# Patient Record
Sex: FEMALE
[De-identification: names, ages, dates, MRNs, and addresses within clinical notes are randomized per-mention and may not be internally consistent; named-entity substitution may affect disease eponyms.]

---

## 2017-04-05 ENCOUNTER — HOSPITAL ENCOUNTER (OUTPATIENT)
Dept: HOSPITAL 41 - JD.SDS | Age: 52
Discharge: HOME | End: 2017-04-05
Attending: SURGERY
Payer: COMMERCIAL

## 2017-04-05 VITALS — SYSTOLIC BLOOD PRESSURE: 95 MMHG | DIASTOLIC BLOOD PRESSURE: 54 MMHG

## 2017-04-05 DIAGNOSIS — Z12.11: Primary | ICD-10-CM

## 2017-04-05 DIAGNOSIS — Z79.899: ICD-10-CM

## 2017-04-05 DIAGNOSIS — D69.3: ICD-10-CM

## 2017-04-05 DIAGNOSIS — K57.30: ICD-10-CM

## 2017-04-05 DIAGNOSIS — K64.8: ICD-10-CM

## 2017-04-05 DIAGNOSIS — Z90.49: ICD-10-CM

## 2017-04-05 DIAGNOSIS — Z98.51: ICD-10-CM

## 2017-04-05 DIAGNOSIS — Z91.041: ICD-10-CM

## 2017-04-05 DIAGNOSIS — Z79.890: ICD-10-CM

## 2017-04-05 DIAGNOSIS — E03.9: ICD-10-CM

## 2017-04-05 DIAGNOSIS — K64.4: ICD-10-CM

## 2017-04-05 PROCEDURE — 45378 DIAGNOSTIC COLONOSCOPY: CPT

## 2017-04-05 NOTE — PCM.OPNOTE
- General Post-Op/Procedure Note


Date of Surgery/Procedure: 04/05/17


Operative Procedure(s): colonoscopy


Findings: 





anal tag


internal hemorrhoids


two sigmoid diverticulae


Pre Op Diagnosis: screening


Post-Op Diagnosis: 1. anal tag.  2. two small sigmoid diverticula.  3. small 

uncomplicated hemorrhoids


Anesthesia Technique: MAC, Moderate sedation


Primary Surgeon: Vasyl Garza


Pathology: 





none





EBL in mLs: 0


Complications: None


Condition: Good


Free Text/Narrative:: 





After adequate IV sedation and analgesia was obtained the patient was placed on 

her left side. Perianal inspection revealed a small, anal tag, with small 

uncomplicated internal hemorrhoids. Digital rectal examination was normal. A 

lubricated colonoscope was inserted into the rectum then advanced under direct 

vision to the cecum. The bowel preparation was excellent. The cecum, right colon

, transverse, and descending colons were endoscopically normal with no mass, 

lesions or inflammatory changes seen. The sigmoid had 2 very small 

uncomplicated diverticula. The rectum in both views was unremarkable. 

Representative photographs were taken for the patient and for the record. There 

were no procedural complications.

## 2017-04-05 NOTE — PCM.PREANE
Preanesthetic Assessment





- Allergies


Allergies/Adverse Reactions: 


 Allergies











Allergy/AdvReac Type Severity Reaction Status Date / Time


 


iodine Allergy  Hives Verified 04/04/17 16:06














PreAnesthesia Questionnaire





- SUBSTANCE USE


Tobacco Use Within Last Twelve Months: No


Second Hand Smoke Exposure: No


Recreational Drug Use History: No





- HOME MEDS


Home Medications: 


 Home Meds





Escitalopram [Lexapro] 10 mg PO DAILY 04/04/17 [History]


Levothyroxine [Synthroid] 50 mg PO DAILY 04/04/17 [History]


Progesterone,Micronized [Crinone] 1 applic TOP ASDIRECTED 04/04/17 [History]











- CURRENT (IN HOUSE) MEDS


Current Meds: 





 Current Medications





Lactated Ringer's (Ringers, Lactated)  1,000 mls @ 125 mls/hr IV ASDIRECTED SOUTH


   Stop: 04/05/17 23:00


Lidocaine/Sodium Bicarbonate (Buffered Lidocaine 1% In Ns 8.4%)  0.25 ml IV 

ONETIME PRN


   PRN Reason: Prior to IV Start


   Stop: 04/05/17 18:00


Sodium Chloride (Normal Saline)  10 ml FLUSH ONETIME SOUTH


   Stop: 04/05/17 18:00





Discontinued Medications





Fentanyl (Sublimaze) Confirm Administered Dose 100 mcg .ROUTE .STK-MED ONE


   Stop: 04/05/17 07:34


Lidocaine HCl (Xylocaine-Mpf 1%) Confirm Administered Dose 6 mls @ as directed 

.ROUTE .STK-MED ONE


   Stop: 04/05/17 07:34


Midazolam HCl (Versed 1 Mg/Ml) Confirm Administered Dose 2 mg .ROUTE .STK-MED 

ONE


   Stop: 04/05/17 07:35


Propofol (Diprivan  20 Ml) Confirm Administered Dose 200 mg .ROUTE .STK-MED ONE


   Stop: 04/05/17 07:34











Preanesthetic Assessment





- ANESTHESIA/TRANSFUSION/FAMILY HX


Anesthesia/Transfusion History: No Prior Transfusion(s), Prior Anesthesia


Type of Anesthesia Reaction: Reports: Unknown


Family History of Anesthesia Reaction: No


Intubation History: Unknown





- REVIEW OF SYSTEMS


Constitutional: Reports: cold symptoms (resolving )


CNS: Reports: no symptoms


Respiratory: Reports: no symptoms


Cardiovascular: Reports: palpitations (negative stress test monday, echo on 

monday "said I have a slight murmer")


GI: Reports: no symptoms


Other: Reports: None, Thyroid Problems (hypothyroidism- controlled with 

medicartions ), Depression (controlled with medications ), Anxiety





- PHYSICAL ASSESSMENT


HR: 68


O2 Sat by Pulse Oximetry: 96


RR: 16


BP: 116/71


Temp: 37.2 C


Height: 1.68 m


Weight: 79.832 kg


NPO Status Date: 04/04/17


NPO Status Time: 21:00


ASA Class: 2


Mental Status: Alert & Oriented x3


Airway Class: Mallampati = 2


Dentition: Reports: Normal Dentition


Thyro-Mental Finger Breadths: 3


Mouth Opening Finger Breadths: 3


ROM/Head Extension: Full


Respiratory Status: lungs clear to auscultation bilaterally


Cardiovascular Status: regular rate & rhythm, normal S1, S2, no murmur, blood 

pressure WNL





- ALLERGIES


Allergies/Adverse Reactions: 


 Allergies











Allergy/AdvReac Type Severity Reaction Status Date / Time


 


iodine Allergy  Hives Verified 04/04/17 16:06














- BLOOD


Blood Available: No


Product(s) Available: None





- ANESTHESIA PLAN


Preop Beta Blocker: No


Anesthesia Type Planned: MAC





- ACKNOWLEDGEMENTS


Pt an Appropriate Candidate for the Planned Anesthesia: Yes


Alternatives and Risks of Anesthesia Discussed w Pt/Guardian: Yes


Pt/Guardian Understands and Agrees with Anesthesia Plan: Yes

## 2020-09-22 ENCOUNTER — HOSPITAL ENCOUNTER (OUTPATIENT)
Dept: HOSPITAL 41 - JD.SDS | Age: 55
Discharge: HOME | End: 2020-09-22
Attending: OBSTETRICS & GYNECOLOGY
Payer: COMMERCIAL

## 2020-09-22 VITALS — HEART RATE: 68 BPM | DIASTOLIC BLOOD PRESSURE: 74 MMHG | SYSTOLIC BLOOD PRESSURE: 125 MMHG

## 2020-09-22 DIAGNOSIS — N80.0: ICD-10-CM

## 2020-09-22 DIAGNOSIS — N73.6: ICD-10-CM

## 2020-09-22 DIAGNOSIS — N72: ICD-10-CM

## 2020-09-22 DIAGNOSIS — E03.9: ICD-10-CM

## 2020-09-22 DIAGNOSIS — N83.8: ICD-10-CM

## 2020-09-22 DIAGNOSIS — N83.291: ICD-10-CM

## 2020-09-22 DIAGNOSIS — N88.8: ICD-10-CM

## 2020-09-22 DIAGNOSIS — F41.9: ICD-10-CM

## 2020-09-22 DIAGNOSIS — Z79.899: ICD-10-CM

## 2020-09-22 DIAGNOSIS — F32.9: ICD-10-CM

## 2020-09-22 DIAGNOSIS — Z79.890: ICD-10-CM

## 2020-09-22 DIAGNOSIS — N87.9: Primary | ICD-10-CM

## 2020-09-22 DIAGNOSIS — N94.89: ICD-10-CM

## 2020-09-22 DIAGNOSIS — Z01.812: ICD-10-CM

## 2020-09-22 DIAGNOSIS — Z20.828: ICD-10-CM

## 2020-09-22 DIAGNOSIS — E78.00: ICD-10-CM

## 2020-09-22 DIAGNOSIS — N83.292: ICD-10-CM

## 2020-09-22 DIAGNOSIS — Z79.82: ICD-10-CM

## 2020-09-22 PROCEDURE — 80053 COMPREHEN METABOLIC PANEL: CPT

## 2020-09-22 PROCEDURE — 84439 ASSAY OF FREE THYROXINE: CPT

## 2020-09-22 PROCEDURE — 86900 BLOOD TYPING SEROLOGIC ABO: CPT

## 2020-09-22 PROCEDURE — 81001 URINALYSIS AUTO W/SCOPE: CPT

## 2020-09-22 PROCEDURE — 86901 BLOOD TYPING SEROLOGIC RH(D): CPT

## 2020-09-22 PROCEDURE — 84443 ASSAY THYROID STIM HORMONE: CPT

## 2020-09-22 PROCEDURE — 36415 COLL VENOUS BLD VENIPUNCTURE: CPT

## 2020-09-22 PROCEDURE — 87635 SARS-COV-2 COVID-19 AMP PRB: CPT

## 2020-09-22 PROCEDURE — 85025 COMPLETE CBC W/AUTO DIFF WBC: CPT

## 2020-09-22 PROCEDURE — 58552 LAPARO-VAG HYST INCL T/O: CPT

## 2020-09-22 PROCEDURE — 86850 RBC ANTIBODY SCREEN: CPT

## 2020-09-22 PROCEDURE — U0002 COVID-19 LAB TEST NON-CDC: HCPCS

## 2020-09-22 RX ADMIN — SODIUM CHLORIDE ONE ML: 9 INJECTION, SOLUTION INTRAMUSCULAR; INTRAVENOUS; SUBCUTANEOUS at 10:57

## 2020-09-22 RX ADMIN — LIDOCAINE HYDROCHLORIDE AND EPINEPHRINE ONE ML: 10; 10 INJECTION, SOLUTION INFILTRATION; PERINEURAL at 10:57

## 2020-09-22 RX ADMIN — SODIUM CHLORIDE ONE ML: 9 INJECTION, SOLUTION INTRAMUSCULAR; INTRAVENOUS; SUBCUTANEOUS at 11:00

## 2020-09-22 RX ADMIN — Medication PRN ML: at 08:30

## 2020-09-22 RX ADMIN — LIDOCAINE HYDROCHLORIDE AND EPINEPHRINE ONE ML: 10; 10 INJECTION, SOLUTION INFILTRATION; PERINEURAL at 11:00

## 2020-09-22 RX ADMIN — Medication PRN ML: at 08:00

## 2020-09-22 NOTE — [id number]
Post Anesthesia Note





- EVALUATION WITHIN 48HRS OF ANESTHETIC


Vital Signs in Normal Range: Yes


Patient Participated in Evaluation: Yes


Respiratory Function Stable: Yes


Airway Patent: Yes


Cardiovascular Function Stable: Yes


Hydration Status Stable: Yes


Pain Control Satisfactory: Yes


Nausea and Vomiting Control Satisfactory: Yes


Mental Status Recovered: Yes


Vital Signs: 


                                Last Vital Signs











Temp  36.6 C   09/22/20 12:30


 


Pulse  71   09/22/20 08:05


 


Resp  12   09/22/20 12:30


 


BP  124/81   09/22/20 12:30


 


Pulse Ox  100   09/22/20 12:30

## 2020-09-22 NOTE — PCM.PREANE
Preanesthetic Assessment





- Procedure


Proposed Procedure: 





[facility]





- Anesthesia/Transfusion/Family Hx


Anesthesia History: Prior Anesthesia Without Reaction


Family History of Anesthesia Reaction: No


Transfusion History: No Prior Transfusion(s)





- Review of Systems


General: No Symptoms


Pulmonary: No Symptoms


Cardiovascular: No Symptoms


Gastrointestinal: No Symptoms


Neurological: No Symptoms


Other: Reports: Thyroid Problems, Anxiety





- Physical Assessment


NPO Status Date: 09/21/20


NPO Status Time: 22:30


Vital Signs: 





134/88


71


94%


16


96.9


Height: 5 ft 5 in


Weight: 79.7 kg


ASA Class: 2


Mental Status: Alert & Oriented x3


Airway Class: Mallampati = 2


Dentition: Reports: Normal Dentition, Crown(s) (front teeth on top are porcelain

caps)


Thyro-Mental Finger Breadths: 3


Mouth Opening Finger Breadths: 3


ROM/Head Extension: Full


Lungs: Clear to Auscultation, Normal Respiratory Effort


Cardiovascular: Regular Rate, Regular Rhythm, No Murmurs





- Lab


Values: 





                             Laboratory Last Values











WBC  5.87 K/mm3 (3.98-10.04)   09/21/20  09:27    


 


RBC  4.70 M/mm3 (3.98-5.22)   09/21/20  09:27    


 


Hgb  14.0 gm/dl (11.2-15.7)   09/21/20  09:27    


 


Hct  41.5 % (34.1-44.9)   09/21/20  09:27    


 


MCV  88.3 fl (79.4-94.8)   09/21/20  09:27    


 


MCH  29.8 pg (25.6-32.2)   09/21/20  09:27    


 


MCHC  33.7 g/dl (32.2-35.5)   09/21/20  09:27    


 


RDW Std Deviation  42.5 fL (36.4-46.3)   09/21/20  09:27    


 


Plt Count  435 K/mm3 (182-369)  H  09/21/20  09:27    


 


MPV  10.1 fl (9.4-12.3)   09/21/20  09:27    


 


Neut % (Auto)  61.4 % (34.0-71.1)   09/21/20  09:27    


 


Lymph % (Auto)  28.8 % (19.3-51.7)   09/21/20  09:27    


 


Mono % (Auto)  7.7 % (4.7-12.5)   09/21/20  09:27    


 


Eos % (Auto)  1.5  (0.7-5.8)   09/21/20  09:27    


 


Baso % (Auto)  0.3 % (0.1-1.2)   09/21/20  09:27    


 


Neut # (Auto)  3.60 K/mm3 (1.56-6.13)   09/21/20  09:27    


 


Lymph # (Auto)  1.69 K/mm3 (1.18-3.74)   09/21/20  09:27    


 


Mono # (Auto)  0.45 K/mm3 (0.24-0.36)  H  09/21/20  09:27    


 


Eos # (Auto)  0.09 K/mm3 (0.04-0.36)   09/21/20  09:27    


 


Baso # (Auto)  0.02 K/mm3 (0.01-0.08)   09/21/20  09:27    


 


Sodium  142 mEq/L (136-145)   09/21/20  09:27    


 


Potassium  4.0 mEq/L (3.5-5.1)   09/21/20  09:27    


 


Chloride  105 mEq/L ()   09/21/20  09:27    


 


Carbon Dioxide  27 mEq/L (21-32)   09/21/20  09:27    


 


Anion Gap  14.0  (5-15)   09/21/20  09:27    


 


BUN  14 mg/dL (7-18)   09/21/20  09:27    


 


Creatinine  0.9 mg/dL (0.55-1.02)   09/21/20  09:27    


 


Est Cr Clr Drug Dosing  TNP   09/21/20  09:27    


 


Estimated GFR (MDRD)  > 60 mL/min (>60)   09/21/20  09:27    


 


BUN/Creatinine Ratio  15.6  (14-18)   09/21/20  09:27    


 


Glucose  104 mg/dL ()   09/21/20  09:27    


 


Calcium  8.5 mg/dL (8.5-10.1)   09/21/20  09:27    


 


Total Bilirubin  0.3 mg/dL (0.2-1.0)   09/21/20  09:27    


 


AST  50 U/L (15-37)  H  09/21/20  09:27    


 


ALT  122 U/L (14-59)  H  09/21/20  09:27    


 


Alkaline Phosphatase  88 U/L ()   09/21/20  09:27    


 


Total Protein  7.4 g/dl (6.4-8.2)   09/21/20  09:27    


 


Albumin  4.1 g/dl (3.4-5.0)   09/21/20  09:27    


 


Globulin  3.3 gm/dL  09/21/20  09:27    


 


Albumin/Globulin Ratio  1.2  (1-2)   09/21/20  09:27    


 


Free T4  1.07 ng/dL (0.76-1.46)   09/21/20  09:27    


 


TSH 3rd Generation  0.417 uIU/mL (0.358-3.74)   09/21/20  09:27    


 


Urine Color  Yellow  (Yellow)   09/21/20  09:27    


 


Urine Appearance  Clear  (Clear)   09/21/20  09:27    


 


Urine pH  6.0  (5.0-8.0)   09/21/20  09:27    


 


Ur Specific Gravity  1.015  (1.005-1.030)   09/21/20  09:27    


 


Urine Protein  Negative  (Negative)   09/21/20  09:27    


 


Urine Glucose (UA)  Negative  (Negative)   09/21/20  09:27    


 


Urine Ketones  Negative  (Negative)   09/21/20  09:27    


 


Urine Occult Blood  Negative  (Negative)   09/21/20  09:27    


 


Urine Nitrite  Negative  (Negative)   09/21/20  09:27    


 


Urine Bilirubin  Negative  (Negative)   09/21/20  09:27    


 


Urine Urobilinogen  0.2  (0.2-1.0)   09/21/20  09:27    


 


Ur Leukocyte Esterase  Negative  (Negative)   09/21/20  09:27    


 


Urine RBC  0-5 /hpf (0-5)   09/21/20  09:27    


 


Urine WBC  0-5 /hpf (0-5)   09/21/20  09:27    


 


Ur Epithelial Cells  5-10 /hpf (0-5)  H  09/21/20  09:27    


 


Urine Bacteria  Few /hpf (FEW)   09/21/20  09:27    


 


Urine Mucus  Not seen /hpf (FEW)   09/21/20  09:27    


 


SARS-CoV-2 (PCR)  Not detected  (NOT DETECT)   09/18/20  13:00    


 


Blood Type  A POSITIVE   09/21/20  09:27    


 


Gel Antibody Screen  Negative   09/21/20  09:27    














- Allergies


Allergies/Adverse Reactions: 


                                    Allergies











Allergy/AdvReac Type Severity Reaction Status Date / Time


 


Iodinated Contrast Media Allergy  Hives Verified 09/21/20 13:12


 


meperidine [From Demerol] Allergy  Nausea and Verified 09/21/20 13:12





   Vomiting  














- Blood


Blood Available: No





- Acknowledgements


Anesthesia Type Planned: General Anesthesia


Pt an Appropriate Candidate for the Planned Anesthesia: Yes


Alternatives and Risks of Anesthesia Discussed w Pt/Guardian: Yes


Pt/Guardian Understands and Agrees with Anesthesia Plan: Yes





PreAnesthesia Questionnaire


HEENT History: Reports: Cataract, Impaired Vision


Cardiovascular History: Reports: High Cholesterol, Other (See Below)


Other Cardiovascular History: palpitations-panic attack


Respiratory History: Reports: None


Gastrointestinal History: Reports: None


Genitourinary History: Reports: None


OB/GYN History: Reports: Other (See Below)


Other OB/BYN History: thickened endometrium, HRT


Musculoskeletal History: Reports: Other (See Below)


Other Musculoskeletal History: myalgia, foot and leg pain


Neurological History: Reports: Migraines (used to-not now)


Psychiatric History: Reports: Anxiety, Depression


Endocrine/Metabolic History: Reports: Hypothyroidism


Hematologic History: Reports: Other (See Below)


Other Hematologic History: thrombocytopathia


Immunologic History: Reports: None


Oncologic (Cancer) History: Reports: None


Dermatologic History: Reports: None





- Past Surgical History


Head Surgeries/Procedures: Reports: None


HEENT Surgical History: Reports: Cataract Surgery, Oral Surgery, Tonsillectomy


Cardiovascular Surgical History: Reports: None


Respiratory Surgical History: Reports: None


GI Surgical History: Reports: Appendectomy, Colonoscopy


Female  Surgical History: Reports: Breast Reduction, Tubal Ligation


Male  Surgical History: Reports: None


Endocrine Surgical History: Reports: None


Neurological Surgical History: Reports: None


Musculoskeletal Surgical History: Reports: None


Oncologic Surgical History: Reports: None


Dermatological Surgical History: Reports: None





- SUBSTANCE USE


Smoking Status *Q: Never Smoker


Tobacco Use Within Last Twelve Months: No


Second Hand Smoke Exposure: No


Days Per Week of Alcohol Use: 1 (rare)


Recreational Drug Use History: No





- HOME MEDS


Home Medications: 


                                    Home Meds





Aspirin 81 mg PO DAILY 09/21/20 [History]


Docusate Sodium [Stool Softener] 100 mg PO DAILY 09/21/20 [History]


Levothyroxine Sodium 88 mcg PO DAILY 09/21/20 [History]


Vit C/Ascorb Sod/Multivit-Min [Emergen-C 500 mg Chewable Tab] 500 mg PO DAILY 

09/21/20 [History]


busPIRone [Buspar] 7.5 mg PO DAILY 09/21/20 [History]











- CURRENT (IN HOUSE) MEDS


Current Meds: 





                               Current Medications





Lactated Ringer's (Ringers, Lactated)  1,000 mls @ 125 mls/hr IV ASDIRECTED SOUTH


   Stop: 09/22/20 18:00


Lidocaine/Sodium Bicarbonate (Buffered Lidocaine 1% In Ns 8.4%)  0.25 ml IDERM 

ONETIME PRN


   PRN Reason: Prior to IV Start


   Stop: 09/22/20 18:00


Sodium Chloride (Saline Flush)  10 ml FLUSH ASDIRECTED PRN


   PRN Reason: Keep Vein Open


   Stop: 09/22/20 18:00





Discontinued Medications





Cefazolin Sodium (Ancef) Confirm Administered Dose 2 gm .ROUTE .STK-MED ONE


   Stop: 09/22/20 08:33


Dexamethasone (Dexamethasone) Confirm Administered Dose 20 mg .ROUTE .STK-MED 

ONE


   Stop: 09/22/20 08:33


Fentanyl (Sublimaze) Confirm Administered Dose 250 mcg .ROUTE .STK-MED ONE


   Stop: 09/22/20 08:33


Lidocaine HCl (Xylocaine-Mpf 1%) Confirm Administered Dose 4 mls @ as directed 

.ROUTE .STK-MED ONE


   Stop: 09/22/20 08:33


Lactated Ringer's (Ringers, Lactated) Confirm Administered Dose 1,000 mls @ as 

directed .ROUTE .STK-MED ONE


   Stop: 09/22/20 08:33


Midazolam HCl (Versed 1 Mg/Ml) Confirm Administered Dose 2 mg .ROUTE .STK-MED 

ONE


   Stop: 09/22/20 08:33


Ondansetron HCl (Zofran) Confirm Administered Dose 4 mg .ROUTE .STK-MED ONE


   Stop: 09/22/20 08:33


Propofol (Diprivan  20 Ml) Confirm Administered Dose 400 mg .ROUTE .STK-MED ONE


   Stop: 09/22/20 08:33


Rocuronium Bromide (Zemuron) Confirm Administered Dose 50 mg .ROUTE .STK-MED ONE


   Stop: 09/22/20 08:33

## 2020-09-22 NOTE — PCM.POSTAN
Calling regarding: Pt requested a call back re pt stated she was treated for a UTI a couple of weeks ago and the symptoms are returning. Pt stated she has pressure and frequency, but no pain, burning or fever. Please advise.               Caller: Pt    Timeframe for callback: Today      Pharmacy information verified:  Yes     Phone number to be reached at: CELL: [phone]            POST ANESTHESIA ASSESSMENT





- MENTAL STATUS


Mental Status: Alert, Oriented





- VITAL SIGNS


Vital Signs: 


                                Last Vital Signs











Temp  36.4 C   09/22/20 11:46


 


Pulse  95  09/22/20 1146


 


Resp  16   09/22/20 11:46


 


BP  133/83   09/22/20 11:46


 


Pulse Ox  98   09/22/20 11:46














- RESPIRATORY


Respiratory Status: Respiratory Rate WNL, Airway Patent, O2 Saturation Stable, 

Supplemental Oxygen





- CARDIOVASCULAR


CV Status: Pulse Rate WNL, Blood Pressure Stable





- GASTROINTESTINAL


GI Status: No Symptoms





- PAIN


Pain Score: 0





- POST OP HYDRATION


Hydration Status: Adequate & Stable

## 2021-03-30 ENCOUNTER — HOSPITAL ENCOUNTER (OUTPATIENT)
Dept: HOSPITAL 41 - JD.SDS | Age: 56
Setting detail: OBSERVATION
LOS: 1 days | Discharge: HOME | End: 2021-03-31
Attending: OBSTETRICS & GYNECOLOGY | Admitting: OBSTETRICS & GYNECOLOGY
Payer: COMMERCIAL

## 2021-03-30 DIAGNOSIS — Z91.041: ICD-10-CM

## 2021-03-30 DIAGNOSIS — E03.9: ICD-10-CM

## 2021-03-30 DIAGNOSIS — Z79.899: ICD-10-CM

## 2021-03-30 DIAGNOSIS — G47.00: ICD-10-CM

## 2021-03-30 DIAGNOSIS — Z98.890: ICD-10-CM

## 2021-03-30 DIAGNOSIS — E78.00: ICD-10-CM

## 2021-03-30 DIAGNOSIS — N81.3: Primary | ICD-10-CM

## 2021-03-30 DIAGNOSIS — Z79.82: ICD-10-CM

## 2021-03-30 DIAGNOSIS — N99.840: ICD-10-CM

## 2021-03-30 DIAGNOSIS — Z79.890: ICD-10-CM

## 2021-03-30 PROCEDURE — 57260 CMBN ANT PST COLPRHY: CPT

## 2021-03-30 PROCEDURE — 96374 THER/PROPH/DIAG INJ IV PUSH: CPT

## 2021-03-30 PROCEDURE — 85025 COMPLETE CBC W/AUTO DIFF WBC: CPT

## 2021-03-30 PROCEDURE — 81001 URINALYSIS AUTO W/SCOPE: CPT

## 2021-03-30 PROCEDURE — 96376 TX/PRO/DX INJ SAME DRUG ADON: CPT

## 2021-03-30 PROCEDURE — 94760 N-INVAS EAR/PLS OXIMETRY 1: CPT

## 2021-03-30 PROCEDURE — 36415 COLL VENOUS BLD VENIPUNCTURE: CPT

## 2021-03-30 PROCEDURE — 93005 ELECTROCARDIOGRAM TRACING: CPT

## 2021-03-30 PROCEDURE — G0378 HOSPITAL OBSERVATION PER HR: HCPCS

## 2021-03-30 PROCEDURE — 56810 PERINEOPLASTY RPR PER NONOB: CPT

## 2021-03-30 RX ADMIN — KETOROLAC TROMETHAMINE PRN MG: 15 INJECTION, SOLUTION INTRAMUSCULAR; INTRAVENOUS at 23:51

## 2021-03-30 RX ADMIN — LIDOCAINE HYDROCHLORIDE,EPINEPHRINE BITARTRATE ONE ML: 10; .01 INJECTION, SOLUTION INFILTRATION; PERINEURAL at 09:29

## 2021-03-30 RX ADMIN — FENTANYL CITRATE PRN MCG: 50 INJECTION, SOLUTION INTRAMUSCULAR; INTRAVENOUS at 11:06

## 2021-03-30 RX ADMIN — SODIUM CHLORIDE ONE ML: 9 INJECTION, SOLUTION INTRAMUSCULAR; INTRAVENOUS; SUBCUTANEOUS at 09:24

## 2021-03-30 RX ADMIN — LIDOCAINE HYDROCHLORIDE,EPINEPHRINE BITARTRATE ONE ML: 10; .01 INJECTION, SOLUTION INFILTRATION; PERINEURAL at 09:24

## 2021-03-30 RX ADMIN — FENTANYL CITRATE PRN MCG: 50 INJECTION, SOLUTION INTRAMUSCULAR; INTRAVENOUS at 10:38

## 2021-03-30 RX ADMIN — SODIUM CHLORIDE ONE ML: 9 INJECTION, SOLUTION INTRAMUSCULAR; INTRAVENOUS; SUBCUTANEOUS at 09:29

## 2021-03-30 NOTE — PCM.OPNOTE
- General Post-Op/Procedure Note


Date of Surgery/Procedure: 03/30/21


Operative Procedure(s): Exam under anesthesia, evacuation of vaginal hematoma


Findings: 





Under anesthesia patient was noted to have a tense area in the rectovaginal 

septum consistent with a hematoma.  Hematoma itself was approximately 200 to 300

cc.  An active arterial bleeder was noted on the patient's left side.  Rectal 

mucosa remained intact.


Pre Op Diagnosis: Vaginal hematoma


Post-Op Diagnosis: Same


Anesthesia Technique: General ET Tube


Primary Surgeon: Arley Keen


Secondary Surgeon: Darrius Acuna


Anesthesia Provider: Valentina Hearn


Assistant: Fausto Toussaint


Reason Assistant Was Necessary: 





Retraction, assistance, patient safety, quality of care.


Fluid Replacement, Intraop: 1,400


Output, Urine Amount: 300


EBL in mLs: 500 (Routine clot and EBL at time of surgery)


Drain/Tube Comments:: Indwelling bladder catheter placed at the end of the 

procedure with resultant clear urine.


Complications: None


Condition: Good


Free Text/Narrative:: 


                                 Intake & Output











 03/30/21 03/30/21 03/30/21





 06:59 14:59 22:59


 


Intake Total  1900 1700


 


Output Total  600 


 


Balance  1300 1700








Surgery duration: 37 minutes





Procedure: Discussion was held with patient as to the anticipated procedure, 

potential variations of the procedure consent was signed.  Patient was taken to 

the operating room and placed in the supine position on the operating table.  

She received 2 g of Ancef preoperatively for infection prophylaxis and had 

sequential compression stockings in place for DVT prophylaxis.  After adequate 

anesthesia patient was placed in a dorsolithotomy position and prepped and 

draped in usual fashion.  Internal vaginal prep was not performed except for the

instillation of Betadine into the vaginal.  Exam under anesthesia showed a tense

vaginal incision from the posterior vaginal repair done earlier in the day.  

Rectal exam showed a bulge into the rectum consistent with.





The suture line overlying the posterior vaginal repair was then opened and 

hematoma was evacuated.  200 to 300 cc of blood were present.  There was a small

arterial bleeder on the left side of the incision and this was controlled with a

figure-of-eight suture of 0 Monocryl.  Some mucosal venous bleeders were noted. 

These were controlled eventually with interrupted mucosal closure using 

figure-of-eight sutures of 3-0 Monocryl.  Prior to the closure of the vaginal 

mucosa Gelfoam was placed in the area to further establish hemostasis of any 

potential vaginal venous bleeding.





After the vaginal close was reapproximated, the perineoplasty was evaluated and 

a short running suture of 3-0 Monocryl was used to reestablish approximate the 

skin edges overlying perineoplasty repair.  Should be noted perineoplasty was 

not taken down for the evacuation of the hematoma.





Patient tolerated procedure well.  Robison catheter was placed at the end of the 

procedure to drain the bladder.  Patient was returned to the supine position and

awakened from general endotracheal anesthesia.  Condition: Good.

## 2021-03-30 NOTE — PCM.POSTAN
POST ANESTHESIA ASSESSMENT





- MENTAL STATUS


Mental Status: Alert, Oriented





- VITAL SIGNS


Vital Signs: 


                                Last Vital Signs











Temp  36.3 C   03/30/21 07:35


 


Pulse  64   03/30/21 07:35


 


Resp  16   03/30/21 07:35


 


BP  110/74   03/30/21 07:35


 


Pulse Ox  95   03/30/21 07:35














- RESPIRATORY


Respiratory Status: Respiratory Rate WNL, Airway Patent, O2 Saturation Stable, 

Supplemental Oxygen





- CARDIOVASCULAR


CV Status: Pulse Rate WNL, Blood Pressure Stable





- GASTROINTESTINAL


GI Status: No Symptoms





- PAIN


Pain Score: 4





- POST OP HYDRATION


Hydration Status: Adequate & Stable

## 2021-03-30 NOTE — [id number]
Post Anesthesia Note





- EVALUATION WITHIN 48HRS OF ANESTHETIC


Vital Signs in Normal Range: Yes


Patient Participated in Evaluation: Yes


Respiratory Function Stable: Yes


Airway Patent: Yes


Cardiovascular Function Stable: Yes


Hydration Status Stable: Yes


Pain Control Satisfactory: Yes


Nausea and Vomiting Control Satisfactory: Yes


Mental Status Recovered: Yes


Vital Signs: 


                                Last Vital Signs











Temp  36.4 C   03/30/21 12:00


 


Pulse  60   03/30/21 12:00


 


Resp  16   03/30/21 12:00


 


BP  108/70   03/30/21 12:00


 


Pulse Ox  100   03/30/21 12:00

## 2021-03-30 NOTE — PCM.POSTAN
POST ANESTHESIA ASSESSMENT





- MENTAL STATUS


Mental Status: Alert, Oriented





- VITAL SIGNS


Vital Signs: 


                                Last Vital Signs











Temp  97.6  03/30/21 1936


 


Pulse  80  03/30/21 1936


 


Resp  16  03/30/21 1936


 


BP   100/61  03/30/21 1936


 


Pulse Ox  96%  03/30/21 1936














- RESPIRATORY


Respiratory Status: Respiratory Rate WNL, Airway Patent, O2 Saturation Stable, 

Supplemental Oxygen





- CARDIOVASCULAR


CV Status: Pulse Rate WNL, Blood Pressure Stable





- GASTROINTESTINAL


GI Status: No Symptoms





- POST OP HYDRATION


Hydration Status: Adequate & Stable

## 2021-03-30 NOTE — PCM.PREANE
Preanesthetic Assessment





- Procedure


Proposed Procedure: 





Anterior and Posterior Repair, Perineaplasty





- Anesthesia/Transfusion/Family Hx


Anesthesia History: Prior Anesthesia Without Reaction


Family History of Anesthesia Reaction: No


Transfusion History: No Prior Transfusion(s)





- Review of Systems


General: Fatigue, Other (Insomnia, Hot Flashes. )


Pulmonary: No Symptoms


Cardiovascular: No Symptoms


Gastrointestinal: No Symptoms


Neurological: Headache (Migraines, none today. )


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 

ASA daily. Stopped for surgery x 2 weeks.), Thyroid Problems (Hypothyroidism), 

Depression, Anxiety





- Physical Assessment


NPO Status Date: 03/29/21


NPO Status Time: 23:00


Vital Signs: 





                                Last Vital Signs











Temp  36.3 C   03/30/21 07:35


 


Pulse  64   03/30/21 07:35


 


Resp  16   03/30/21 07:35


 


BP  110/74   03/30/21 07:35


 


Pulse Ox  95   03/30/21 07:35











Height: 1.65 m


Weight: 79.832 kg


ASA Class: 2


Mental Status: Alert & Oriented x3


Airway Class: Mallampati = 2


Dentition: Reports: Normal Dentition


Thyro-Mental Finger Breadths: 3


Mouth Opening Finger Breadths: 3


ROM/Head Extension: Full


Lungs: Clear to Auscultation, Normal Respiratory Effort


Cardiovascular: Regular Rate, Regular Rhythm





- Lab


Values: 





                             Laboratory Last Values











WBC  7.11 K/mm3 (3.98-10.04)   03/29/21  11:52    


 


RBC  5.16 M/mm3 (3.98-5.22)   03/29/21  11:52    


 


Hgb  15.0 gm/dl (11.2-15.7)   03/29/21  11:52    


 


Hct  45.6 % (34.1-44.9)  H  03/29/21  11:52    


 


MCV  88.4 fl (79.4-94.8)   03/29/21  11:52    


 


MCH  29.1 pg (25.6-32.2)   03/29/21  11:52    


 


MCHC  32.9 g/dl (32.2-35.5)   03/29/21  11:52    


 


RDW Std Deviation  42.1 fL (36.4-46.3)   03/29/21  11:52    


 


Plt Count  447 K/mm3 (182-369)  H  03/29/21  11:52    


 


MPV  10.6 fl (9.4-12.3)   03/29/21  11:52    


 


Neut % (Auto)  52.6 % (34.0-71.1)   03/29/21  11:52    


 


Lymph % (Auto)  36.8 % (19.3-51.7)   03/29/21  11:52    


 


Mono % (Auto)  8.6 % (4.7-12.5)   03/29/21  11:52    


 


Eos % (Auto)  1.0  (0.7-5.8)   03/29/21  11:52    


 


Baso % (Auto)  0.6 % (0.1-1.2)   03/29/21  11:52    


 


Neut # (Auto)  3.74 K/mm3 (1.56-6.13)   03/29/21  11:52    


 


Lymph # (Auto)  2.62 K/mm3 (1.18-3.74)   03/29/21  11:52    


 


Mono # (Auto)  0.61 K/mm3 (0.24-0.36)  H  03/29/21  11:52    


 


Eos # (Auto)  0.07 K/mm3 (0.04-0.36)   03/29/21  11:52    


 


Baso # (Auto)  0.04 K/mm3 (0.01-0.08)   03/29/21  11:52    


 


Manual Slide Review  Abnormal smear   03/29/21  11:52    


 


Urine Color  Light yellow  (Yellow)   03/29/21  11:54    


 


Urine Appearance  Clear  (Clear)   03/29/21  11:54    


 


Urine pH  7.0  (5.0-8.0)   03/29/21  11:54    


 


Ur Specific Gravity  1.015  (1.005-1.030)   03/29/21  11:54    


 


Urine Protein  Negative  (Negative)   03/29/21  11:54    


 


Urine Glucose (UA)  Negative  (Negative)   03/29/21  11:54    


 


Urine Ketones  Negative  (Negative)   03/29/21  11:54    


 


Urine Occult Blood  Negative  (Negative)   03/29/21  11:54    


 


Urine Nitrite  Negative  (Negative)   03/29/21  11:54    


 


Urine Bilirubin  Negative  (Negative)   03/29/21  11:54    


 


Urine Urobilinogen  0.2  (0.2-1.0)   03/29/21  11:54    


 


Ur Leukocyte Esterase  Negative  (Negative)   03/29/21  11:54    














- Allergies


Allergies/Adverse Reactions: 


                                    Allergies











Allergy/AdvReac Type Severity Reaction Status Date / Time


 


Iodinated Contrast Media Allergy  Hives Verified 03/29/21 15:05


 


meperidine [From Demerol] AdvReac  Nausea and Verified 03/29/21 15:05





   Vomiting  














- Anesthesia Plan


Pre-Op Medication Ordered: Anxiolytic





- Acknowledgements


Anesthesia Type Planned: General Anesthesia


Pt an Appropriate Candidate for the Planned Anesthesia: Yes


Alternatives and Risks of Anesthesia Discussed w Pt/Guardian: Yes


Pt/Guardian Understands and Agrees with Anesthesia Plan: Yes





PreAnesthesia Questionnaire


HEENT History: Reports: Cataract, Impaired Vision


Cardiovascular History: Reports: High Cholesterol, Other (See Below)


Respiratory History: 


Gastrointestinal History: 


Genitourinary History: 


OB/GYN History: Reports: Other (See Below)


Other OB/BYN History: thickened endometrium, hot flashes, right ovarian cyst, 

post menpausal bleeding, breast reduction


Musculoskeletal History: Reports: Other (See Below)


Other Musculoskeletal History: myalgia, foot and leg pain


Neurological History: Reports: Migraines


Psychiatric History: Reports: Anxiety, Depression


Endocrine/Metabolic History: Reports: Hypothyroidism


Hematologic History: Reports: Other (See Below)


Other Hematologic History: thrombocytopathia


Immunologic History: Reports: None


Oncologic (Cancer) History: Reports: None


Dermatologic History: Reports: None





- Past Surgical History


Head Surgeries/Procedures: Reports: None


HEENT Surgical History: Reports: Cataract Surgery, Oral Surgery, Tonsillectomy


Cardiovascular Surgical History: Reports: None


Respiratory Surgical History: 


GI Surgical History: Reports: Appendectomy, Colonoscopy


Female  Surgical History: Reports: Hysterectomy, Tubal Ligation


Male  Surgical History: 


Endocrine Surgical History: Reports: None


Neurological Surgical History: Reports: None


Musculoskeletal Surgical History: Reports: None


Oncologic Surgical History: Reports: None


Dermatological Surgical History: Reports: None





- SUBSTANCE USE


Tobacco Use Status *Q: Never Tobacco User


Recreational Drug Use History: No





- HOME MEDS


Home Medications: 


                                    Home Meds





Aspirin 81 mg PO DAILY 09/21/20 [History]


Levothyroxine Sodium 88 mcg PO DAILY 09/21/20 [History]


Vit C/Ascorb Sod/Multivit-Min [Emergen-C 500 mg Chewable Tab] 500 mg PO DAILY 

09/21/20 [History]


Cholecalciferol (Vitamin D3) [Vitamin D3] 1,000 unit PO DAILY 03/29/21 [History]











- CURRENT (IN HOUSE) MEDS


Current Meds: 





                               Current Medications





Fentanyl (Fentanyl 100 Mcg/2 Ml Sdv)  50 mcg IVPUSH Q5M PRN


   PRN Reason: Pain


   Stop: 03/30/21 12:01


Hydromorphone HCl (Hydromorphone 0.5 Mg/0.5 Ml Syringe)  0.5 mg IVPUSH Q10M PRN


   PRN Reason: Pain (severe 7-10)


   Stop: 03/30/21 12:00


Lactated Ringer's (Ringers, Lactated)  1,000 mls @ 125 mls/hr IV ASDIRECTED SOUTH


   Last Admin: 03/30/21 07:50 Dose:  125 mls/hr


   Documented by: 


Lidocaine/Sodium Bicarbonate (Lidocaine 1%/Sod Bicarbonate In Ns 8.4% 1 Ml 

Syringe)  0.25 ml IDERM ONETIME PRN


   PRN Reason: Prior to IV Start


   Last Admin: 03/30/21 07:49 Dose:  0.25 ml


   Documented by: 


Ondansetron HCl (Ondansetron 4 Mg/2 Ml Sdv)  4 mg IVPUSH ONETIME PRN


   PRN Reason: Nausea/Vomiting


   Stop: 03/30/21 12:00


Sodium Chloride (Sodium Chloride 0.9% 10 Ml Syringe)  10 ml FLUSH ASDIRECTED PRN


   PRN Reason: Keep Vein Open





Discontinued Medications





Cefazolin Sodium (Cefazolin 1 Gm Vial) Confirm Administered Dose 2 gm .ROUTE 

.STK-MED ONE


   Stop: 03/30/21 07:16


Dexamethasone (Dexamethasone 4 Mg/Ml 5 Ml Mdv) Confirm Administered Dose 20 mg 

.ROUTE .STK-MED ONE


   Stop: 03/30/21 07:17


Fentanyl (Fentanyl 250 Mcg/5 Ml Sdv) Confirm Administered Dose 250 mcg .ROUTE 

.STK-MED ONE


   Stop: 03/30/21 07:17


Lidocaine HCl (Xylocaine-Mpf 1%) Confirm Administered Dose 4 mls @ as directed 

.ROUTE .STK-MED ONE


   Stop: 03/30/21 07:16


Lactated Ringer's (Ringers, Lactated) Confirm Administered Dose 1,000 mls @ as 

directed .ROUTE .STK-MED ONE


   Stop: 03/30/21 07:16


Lidocaine HCl (Xylocaine-Mpf 1%) Confirm Administered Dose 4 mls @ as directed 

.ROUTE .STK-MED ONE


   Stop: 03/30/21 07:17


Lidocaine/Epinephrine (Lidocaine 1% With Epinephrine 1:100,000 10 Ml Mdv) 

Confirm Administered Dose 10 ml .ROUTE .STK-MED ONE


   Stop: 03/30/21 07:48


Midazolam HCl (Midazolam 1 Mg/Ml 2 Ml Sdv) Confirm Administered Dose 2 mg .ROUTE

 .STK-MED ONE


   Stop: 03/30/21 07:17


Ondansetron HCl (Ondansetron 4 Mg/2 Ml Sdv) Confirm Administered Dose 4 mg 

.ROUTE .STK-MED ONE


   Stop: 03/30/21 07:16


Propofol (Propofol 200 Mg/20 Ml Sdv) Confirm Administered Dose 400 mg .ROUTE 

.STK-MED ONE


   Stop: 03/30/21 07:17


Rocuronium Bromide (Rocuronium 50 Mg/5 Ml Vial) Confirm Administered Dose 50 mg 

.ROUTE .STK-MED ONE


   Stop: 03/30/21 07:16


Sodium Chloride (Sodium Chloride 0.9% 50 Ml Sdv) Confirm Administered Dose 50 ml

 .ROUTE .STK-MED ONE


   Stop: 03/30/21 07:48

## 2021-03-30 NOTE — PCM.OPNOTE
- General Post-Op/Procedure Note


Date of Surgery/Procedure: 03/30/21


Operative Procedure(s): Anterior and posterior vaginal repair with perineoplasty


Findings: 





Grade 3 cystocele, grade 2 rectocele.  Vaginal cuff reasonably well supported.  

Patient is status post hysterectomy.


Pre Op Diagnosis: 1.  Grade 3 cystocele.  2.  Grade 2 rectocele


Post-Op Diagnosis: Same


Anesthesia Technique: General ET Tube


Other Anesthesia Type: Lidocaine quarter percent with [id number] cc 

totallocal


Primary Surgeon: Arley Keen


Secondary Surgeon: Darrius Acuna


Anesthesia Provider: Gisela Joya


Assistant: Fausto Toussaint


Reason Assistant Was Necessary: 





Assistance, retraction, patient safety, quality of care.


Fluid Replacement, Intraop: 1,600


Output, Urine Amount: 400


EBL in mLs: 20


Complications: None


Condition: Good


Free Text/Narrative:: 


Surgery duration: 53 minutes








Procedure:  The patient is taken to the operating room and placed in a supine 

position on the operating table. She received 2 g of Ancef preoperatively for 

infection prophylaxis. She had sequential compression stockings in place for DVT

prophylaxis. Patient was administered general endotracheal anesthesia. She was 

placed in a dorsal lithotomy position and prepped and draped in the usual 

fashion.





Anterior vaginal repair was performed. Patient had emptied her bladder on call 

to the OR. Weighted speculum was placed in the vagina and the uppermost portion 

of the cystocele was identified. Two Allis clamps was placed at that uppermost 

point at a position approximately 1-1/2 cm lateral to the midline  A second 

Allis clamp was placed approximately 2 cm from the urethral meatus. The areas 

and infiltrated with lidocaine quarter percent with epinephrine. Approximately 8

mL was used. The 2 lateral Allis clamps were retracted and an epithelial 

incision was made between the 2 clamps. A midline incision was then made with a 

Metzenbaum scissors. The overlying epithelium was then dissected off of the 

underlying vesicovaginal fascia. This all to lateral which when approximately 

midline was felt to be adequate to reduce the cystocele. When this was done 

approximately 4 trapezoidal shaped sutures of 0 Monocryl were then placed 

reapproximating the lateral supportive tissue midline and reducing the 

rectocele. At this point the excess epithelium bilaterally was removed and the 

epithelium was closed in a running fashion with 2 short segments to decrease 

likelihood of shortening of the vagina.





Rectocele was then performed. The uppermost portion of the rectocele was 

identified and was grasped midline with an Allis clamp. The introital area was 

grasped at approximately the 4:00 and 8:00 positions at the junction of the 

vaginal and vulvar epithelium. The area of epithelium was then infiltrated with 

lidocaine quarter percent with epinephrine. A janet-shaped piece of epithelium

was removed from the posterior introital and perineal area. The vaginal 

epithelium was then undermined superiorly to the top of the rectocele. Was then 

incised midline. With sharp and blunt dissection the epithelium was then 

dissected off of the underlying vesicovaginal fascia. At this point 

approximately 5 sutures of 0 Monocryl were placed to reapproximate the lateral 

supportive tissue midline and reduce the rectocele. The excess epithelium was 

then excised and the epithelium overlying the rectocele repair was then 

reapproximated with a running suture of 3-0 Monocryl.





Perineoplasty was then performed with approximately 4 V-shaped stitches of 0 

Monocryl in placed to reapproximate the lateral tissue midline, rebuild the 

perineum about 1 cm and the vagina approximately 1 cm. The epithelium of the 

introitus and perineal body was then reapproximated using 3-0 Monocryl in an 

episiotomy repair fashion. At this time sponge, instrument and needle counts 

were correct. The patient was returned to supine position and was discharged 

from the operating room in good condition.

## 2021-03-30 NOTE — PCM.SN.2
- Free Text/Narrative


Note: 





Postoperative Progress Note:





Juani underwent an anterior and posterior vaginal repair this am for treatment of

a grade 3 cystocle and grade 2 rectocele.  After surgery patient initially did 

well but then began having an increased amount of discomfort in the rectovaginal

area.  Repeated doses of analgesia did not resolve the pain and exam was 

performed showing a tense posterior repair incision line.  Plan is to do an exam

under anesthesia and if a hematoma is suspected to open the suture line and 

evacuate as indicated.  The procedure, risks, benefits and possible variations 

of the procedure necessary to evaluate and treat the findings are discussed with

the patient and her .  Consent is signed.  Will give two grams ancef 

preop. SCDs for DVT prophylaxis.

## 2021-03-31 VITALS — HEART RATE: 85 BPM | DIASTOLIC BLOOD PRESSURE: 50 MMHG | SYSTOLIC BLOOD PRESSURE: 88 MMHG

## 2021-03-31 RX ADMIN — KETOROLAC TROMETHAMINE PRN MG: 15 INJECTION, SOLUTION INTRAMUSCULAR; INTRAVENOUS at 06:02

## 2021-03-31 NOTE — [id number]
Post Anesthesia Note





- EVALUATION WITHIN 48HRS OF ANESTHETIC


Vital Signs in Normal Range: Yes


Patient Participated in Evaluation: Yes


Respiratory Function Stable: Yes


Airway Patent: Yes


Cardiovascular Function Stable: Yes


Hydration Status Stable: Yes


Pain Control Satisfactory: Yes


Nausea and Vomiting Control Satisfactory: Yes


Mental Status Recovered: Yes


Vital Signs: 


                                Last Vital Signs











Temp  36.8 C   03/31/21 02:33


 


Pulse  85   03/31/21 02:33


 


Resp  18   03/31/21 02:33


 


BP  88/50 L  03/31/21 02:33


 


Pulse Ox  95   03/31/21 05:28














- COMMENTS/OBSERVATIONS


Free Text/Narrative:: 





no anesthesia complications noted

## 2021-03-31 NOTE — [id number]
**Discharge Summary





- Hospital Course


Free Text/Narrative:: 





Juani is a 55-year-old multigravida white female admitted on the a.m. of 

3/30/2021 for anterior and posterior vaginal repair with perineoplasty.  She 

underwent surgery without any concerns.  She however started having some rectal 

pain in the postoperative area and after the course of many hours of watching 

and follow-up CBC patient was diagnosed with what appeared to be a rectovaginal 

septum hematoma.  At that time she was taken back for an exam under anesthesia. 

The area overlying the hematoma on the posterior vaginal repair incision was 

opened and a 200 cc hematoma was removed.  The area was reapproximated.  After 

surgery patient's vital signs are stable.  Pain eventually reduced to 2 on a 

scale of 10 where is is now at the time of discharge home.





Patient is vital signs been stable.  She is afebrile.  Pain is well controlled 

with oxycodone 5 mg1 tablet every 4 hours.  Patient is voided well, is 

ambulating well, has minimal vaginal discharge and is desiring discharge home.


Diagnosis: Stroke: No





- Discharge Data


Discharge Date: 03/31/21


Discharge Disposition: Home, Self-Care 01


Condition: Good





- Referral to Home Health


Primary Care Physician: 


JEFFERSON Fraser








- Patient Summary/Data


Operative Procedure(s) Performed: Exam under anesthesia, evacuation of vaginal 

hematoma





- Patient Instructions


Diet: Regular Diet as Tolerated


Activity: As Tolerated (No lifting greater than 15 pounds or driving a car x1 

week.  No intercourse or tampons until seen back.)


Driving: Do Not Drive


Showering/Bathing: May Shower


Showering/Bathing, Other: May take a bath


Notify Provider of: Fever, Increased Pain, Swelling and Redness, Nausea and/or 

Vomiting





- Discharge Plan


Prescriptions/Med Rec: 


Ibuprofen 600 mg PO Q4HR PRN #30 tablet


 PRN Reason: Pain


Home Medications: 


                                    Home Meds





Aspirin 81 mg PO DAILY 09/21/20 [History]


Levothyroxine Sodium 88 mcg PO DAILY 09/21/20 [History]


Vit C/Ascorb Sod/Multivit-Min [Emergen-C 500 mg Chewable Tab] 500 mg PO DAILY 

09/21/20 [History]


Cholecalciferol (Vitamin D3) [Vitamin D3] 1,000 unit PO DAILY 03/29/21 [History]


Ibuprofen 600 mg PO Q4HR PRN #30 tablet 03/30/21 [Rx]








Patient Handouts:  Anterior and Posterior Colporrhaphy, Care After, Ibuprofen 

Oral Tablets and Capsules, Tramadol tablets


Referrals: 


Arley Keen MD [Physician] - 04/26/21 2:00 pm (Return to clinic[facility] weeks. Follow up appointment is already scheduled for Monday, April 26, 2021 at 2:00 pm. )





- Discharge Summary/Plan Comment


DC Time >30 min.: No


Discharge Summary/Plan Comment: 








Discharge instructions:





1. Discharge home





2. Diet, activity and follow-up discussed with patient. Recommend regular diet.





3. Precautions given concern increased pain, bleeding, temperature, signs/sympt

oms of DVT/PE.





4. Medications per home medication was printed, discussed with and given to the 

patient.





5. Return to clinic-Dr. Keen[facility] in 4 weeks.





Diagnosis:





1.  Status post anterior and posterior vaginal repair with perineoplasty





2.  Postoperative vaginal hematoma with exam under anesthesia and evacuation of 

hematoma





Condition: Good





- Patient Data


Vitals - Most Recent: 


                                Last Vital Signs











Temp  36.8 C   03/31/21 02:33


 


Pulse  85   03/31/21 02:33


 


Resp  18   03/31/21 02:33


 


BP  88/50 L  03/31/21 02:33


 


Pulse Ox  95   03/31/21 05:28











Weight - Most Recent: 79.832 kg


I&O - Last 24 hours: 


                                 Intake & Output











 03/30/21 03/31/21 03/31/21





 22:59 06:59 14:59


 


Intake Total 3700 2000 


 


Output Total 600 4100 


 


Balance 3100 -2100 











Lab Results - Last 24 hrs: 


                         Laboratory Results - last 24 hr











  03/30/21 Range/Units





  16:28 


 


WBC  14.92 H  (3.98-10.04)  K/mm3


 


RBC  3.60 L  (3.98-5.22)  M/mm3


 


Hgb  10.5 L D  (11.2-15.7)  gm/dl


 


Hct  32.2 L  (34.1-44.9)  %


 


MCV  89.4  (79.4-94.8)  fl


 


MCH  29.2  (25.6-32.2)  pg


 


MCHC  32.6  (32.2-35.5)  g/dl


 


RDW Std Deviation  41.4  (36.4-46.3)  fL


 


Plt Count  387 H  (182-369)  K/mm3


 


MPV  9.5  (9.4-12.3)  fl


 


Neut % (Auto)  92.7 H  (34.0-71.1)  %


 


Lymph % (Auto)  4.5 L  (19.3-51.7)  %


 


Mono % (Auto)  2.5 L  (4.7-12.5)  %


 


Eos % (Auto)  0 L  (0.7-5.8)  


 


Baso % (Auto)  0.1  (0.1-1.2)  %


 


Neut # (Auto)  13.83 H  (1.56-6.13)  K/mm3


 


Lymph # (Auto)  0.67 L  (1.18-3.74)  K/mm3


 


Mono # (Auto)  0.38 H  (0.24-0.36)  K/mm3


 


Eos # (Auto)  0.00 L  (0.04-0.36)  K/mm3


 


Baso # (Auto)  0.01  (0.01-0.08)  K/mm3


 


Manual Slide Review  Abnormal smear  











Med Orders - Current: 


                               Current Medications





Hydromorphone HCl (Hydromorphone 0.5 Mg/0.5 Ml Syringe)  0.5 mg IVPUSH Q2H PRN


   PRN Reason: Pain


Hydroxyzine HCl (Hydroxyzine Hcl 50 Mg Tab)  50 mg PO Q6H PRN


   PRN Reason: Pain


   Last Admin: 03/30/21 23:32 Dose:  50 mg


   Documented by: 


Lactated Ringer's (Ringers, Lactated)  1,000 mls @ 125 mls/hr IV ASDIRECTED [facility]


   Last Admin: 03/31/21 08:02 Dose:  125 mls/hr


   Documented by: 


Ketorolac Tromethamine (Ketorolac 15 Mg/Ml Sdv)  15 mg IVPUSH Q6H PRN


   PRN Reason: Pain


   Last Admin: 03/31/21 06:02 Dose:  15 mg


   Documented by: 


Oxycodone HCl (Oxycodone 5 Mg Tab)  5 - 10 mg PO Q4H PRN


   PRN Reason: Pain


   Last Admin: 03/31/21 08:15 Dose:  5 mg


   Documented by: 


Sodium Chloride (Sodium Chloride 0.9% 10 Ml Syringe)  10 ml FLUSH ASDIRECTED PRN


   PRN Reason: Keep Vein Open





Discontinued Medications





Cefazolin Sodium (Cefazolin 1 Gm Vial) Confirm Administered Dose 2 gm .ROUTE 

.STK-MED ONE


   Stop: 03/30/21 07:16


Dexamethasone (Dexamethasone 4 Mg/Ml 5 Ml Mdv) Confirm Administered Dose 20 mg 

.ROUTE .STK-MED ONE


   Stop: 03/30/21 07:17


Dexamethasone (Dexamethasone 4 Mg/Ml 5 Ml Mdv) Confirm Administered Dose 20 mg 

.ROUTE .STK-MED ONE


   Stop: 03/30/21 18:24


Diphenhydramine HCl (Diphenhydramine 50 Mg/Ml Sdv)  25 mg IVPUSH Q6H PRN


   PRN Reason: pruritis


Ephedrine Sulfate (Ephedrine 50 Mg/Ml Sdv)  5 mg IVPUSH ASDIRECTED PRN


   PRN Reason: Hypotension


Fentanyl (Fentanyl 250 Mcg/5 Ml Sdv) Confirm Administered Dose 250 mcg .ROUTE 

.STK-MED ONE


   Stop: 03/30/21 07:17


Fentanyl (Fentanyl 100 Mcg/2 Ml Sdv)  50 mcg IVPUSH Q5M PRN


   PRN Reason: Pain


   Stop: 03/30/21 12:01


   Last Admin: 03/30/21 11:06 Dose:  50 mcg


   Documented by: 


Fentanyl (Fentanyl 250 Mcg/5 Ml Sdv) Confirm Administered Dose 250 mcg .ROUTE 

.STK-MED ONE


   Stop: 03/30/21 18:25


Fentanyl (Fentanyl 100 Mcg/2 Ml Sdv)  50 mcg IVPUSH Q20M PRN


   PRN Reason: Pain


Glycopyrrolate (Glycopyrrolate 0.2 Mg/Ml 2 Ml Syringe) Confirm Administered Dose

 0.4 mg .ROUTE .STK-MED ONE


   Stop: 03/30/21 10:12


Hydromorphone HCl (Hydromorphone 0.5 Mg/0.5 Ml Syringe)  0.5 mg IVPUSH Q10M PRN


   PRN Reason: Pain (severe 7-10)


   Stop: 03/30/21 12:00


   Last Admin: 03/30/21 12:55 Dose:  0.5 mg


   Documented by: 


Hydromorphone HCl (Hydromorphone 0.5 Mg/0.5 Ml Syringe)  0.5 mg IVPUSH ONETIME 

ONE


   Stop: 03/30/21 17:01


   Last Admin: 03/30/21 16:54 Dose:  0.5 mg


   Documented by: 


Hydromorphone HCl (Hydromorphone 0.5 Mg/0.5 Ml Syringe)  0.5 mg IVPUSH Q10M PRN


   PRN Reason: Pain (severe 7-10)


   Last Admin: 03/30/21 22:29 Dose:  0.5 mg


   Documented by: 


Hydromorphone HCl (Hydromorphone 0.5 Mg/0.5 Ml Syringe) Confirm Administered 

Dose 0.5 mg .ROUTE .STK-MED ONE


   Stop: 03/30/21 18:02


   Last Admin: 03/30/21 23:53 Dose:  Not Given


   Documented by: 


Hydromorphone HCl (Hydromorphone 0.5 Mg/0.5 Ml Syringe) Confirm Administered 

Dose 1 mg .ROUTE .STK-MED ONE


   Stop: 03/30/21 18:24


Lidocaine HCl (Xylocaine-Mpf 1%) Confirm Administered Dose 4 mls @ as directed 

.ROUTE .STK-MED ONE


   Stop: 03/30/21 07:16


Lactated Ringer's (Ringers, Lactated) Confirm Administered Dose 1,000 mls @ as 

directed .ROUTE .STK-MED ONE


   Stop: 03/30/21 07:16


Lidocaine HCl (Xylocaine-Mpf 1%) Confirm Administered Dose 4 mls @ as directed 

.ROUTE .STK-MED ONE


   Stop: 03/30/21 07:17


Lidocaine HCl (Xylocaine-Mpf 1%) Confirm Administered Dose 4 mls @ as directed 

.ROUTE .STK-MED ONE


   Stop: 03/30/21 18:24


Lactated Ringer's (Ringers, Lactated) Confirm Administered Dose 2,000 mls @ as 

directed .ROUTE .STK-MED ONE


   Stop: 03/30/21 18:24


Ketorolac Tromethamine (Ketorolac 15 Mg/Ml Sdv) Confirm Administered Dose 15 mg 

.ROUTE .STK-MED ONE


   Stop: 03/30/21 10:14


Ketorolac Tromethamine (Ketorolac 30 Mg/Ml Sdv)  30 mg IVPUSH ONETIME SOUTH


   Stop: 03/30/21 16:00


   Last Admin: 03/30/21 16:05 Dose:  30 mg


   Documented by: 


Lidocaine/Epinephrine (Lidocaine 1% With Epinephrine 1:100,000 10 Ml Mdv) 

Confirm Administered Dose 10 ml .ROUTE .STK-MED ONE


   Stop: 03/30/21 07:48


   Last Admin: 03/30/21 09:24 Dose:  5 ml


   Documented by: 


Lidocaine/Sodium Bicarbonate (Lidocaine 1%/Sod Bicarbonate In Ns 8.4% 1 Ml 

Syringe)  0.25 ml IDERM ONETIME PRN


   PRN Reason: Prior to IV Start


   Last Admin: 03/30/21 07:49 Dose:  0.25 ml


   Documented by: 


Midazolam HCl (Midazolam 1 Mg/Ml 2 Ml Sdv) Confirm Administered Dose 2 mg .ROUTE

 .STK-MED ONE


   Stop: 03/30/21 07:17


Midazolam HCl (Midazolam 1 Mg/Ml 2 Ml Sdv) Confirm Administered Dose 2 mg .ROUTE

 .STK-MED ONE


   Stop: 03/30/21 18:25


Midazolam HCl (Midazolam 1 Mg/Ml 2 Ml Sdv)  2 mg IVPUSH ONETIME PRN


   PRN Reason: Sedation


   Last Admin: 03/30/21 20:11 Dose:  2 mg


   Documented by: 


Miscellaneous Medication (Phenylephrine Hcl In 0.9% Nacl 1 Mg/10 Ml Syringe) 

Confirm Administered Dose 1 mg .ROUTE .STK-MED ONE


   Stop: 03/30/21 19:17


Ondansetron HCl (Ondansetron 4 Mg/2 Ml Sdv) Confirm Administered Dose 4 mg 

.ROUTE .STK-MED ONE


   Stop: 03/30/21 07:16


Ondansetron HCl (Ondansetron 4 Mg/2 Ml Sdv)  4 mg IVPUSH ONETIME PRN


   PRN Reason: Nausea/Vomiting


   Stop: 03/30/21 12:00


Ondansetron HCl (Ondansetron 4 Mg/2 Ml Sdv)  4 mg IVPUSH Q4H PRN


   PRN Reason: Nausea


   Stop: 03/30/21 16:00


Phenylephrine HCl (Phenylephrine/Normal Saline 100 Mcg/Ml 10 Ml Syringe)  0.1 mg

 IVPUSH Q10M PRN


   PRN Reason: Hypotension


Propofol (Propofol 200 Mg/20 Ml Sdv) Confirm Administered Dose 400 mg .ROUTE 

.STK-MED ONE


   Stop: 03/30/21 07:17


Propofol (Propofol 200 Mg/20 Ml Sdv) Confirm Administered Dose 200 mg .ROUTE 

.STK-MED ONE


   Stop: 03/30/21 09:50


Propofol (Propofol 200 Mg/20 Ml Sdv) Confirm Administered Dose 200 mg .ROUTE 

.STK-MED ONE


   Stop: 03/30/21 18:25


Rocuronium Bromide (Rocuronium 50 Mg/5 Ml Vial) Confirm Administered Dose 50 mg 

.ROUTE .STK-MED ONE


   Stop: 03/30/21 07:16


Rocuronium Bromide (Rocuronium 50 Mg/5 Ml Vial) Confirm Administered Dose 50 mg 

.ROUTE .STK-MED ONE


   Stop: 03/30/21 18:24


Sodium Chloride (Sodium Chloride 0.9% 50 Ml Sdv) Confirm Administered Dose 50 ml

 .ROUTE .STK-MED ONE


   Stop: 03/30/21 07:48


   Last Admin: 03/30/21 09:24 Dose:  15 ml


   Documented by: 


Tramadol HCl (Tramadol 50 Mg Tab)  50 mg PO Q4H PRN


   PRN Reason: Pain


   Stop: 03/30/21 16:00


Tramadol HCl (Tramadol 50 Mg Tab)  50 mg PO ONETIME ONE


   Stop: 03/30/21 12:05


   Last Admin: 03/30/21 12:10 Dose:  50 mg


   Documented by:

## 2021-06-01 ENCOUNTER — HOSPITAL ENCOUNTER (OUTPATIENT)
Dept: HOSPITAL 41 - JD.SDS | Age: 56
Discharge: HOME | End: 2021-06-01
Attending: OBSTETRICS & GYNECOLOGY
Payer: COMMERCIAL

## 2021-06-01 VITALS — HEART RATE: 42 BPM | DIASTOLIC BLOOD PRESSURE: 66 MMHG | SYSTOLIC BLOOD PRESSURE: 117 MMHG

## 2021-06-01 DIAGNOSIS — E78.00: ICD-10-CM

## 2021-06-01 DIAGNOSIS — D69.1: ICD-10-CM

## 2021-06-01 DIAGNOSIS — Z90.49: ICD-10-CM

## 2021-06-01 DIAGNOSIS — K62.89: ICD-10-CM

## 2021-06-01 DIAGNOSIS — Z79.82: ICD-10-CM

## 2021-06-01 DIAGNOSIS — Z79.890: ICD-10-CM

## 2021-06-01 DIAGNOSIS — Z91.041: ICD-10-CM

## 2021-06-01 DIAGNOSIS — Z98.890: ICD-10-CM

## 2021-06-01 DIAGNOSIS — N89.8: Primary | ICD-10-CM

## 2021-06-01 DIAGNOSIS — E03.9: ICD-10-CM

## 2021-06-01 DIAGNOSIS — N83.291: ICD-10-CM

## 2021-06-01 DIAGNOSIS — G47.00: ICD-10-CM

## 2021-06-01 DIAGNOSIS — Z88.8: ICD-10-CM

## 2021-06-01 DIAGNOSIS — Z79.899: ICD-10-CM

## 2021-06-01 PROCEDURE — 85025 COMPLETE CBC W/AUTO DIFF WBC: CPT

## 2021-06-01 PROCEDURE — 36415 COLL VENOUS BLD VENIPUNCTURE: CPT

## 2021-06-01 PROCEDURE — 57023 I&D VAGINAL HEMATOMA NON-OB: CPT

## 2021-06-01 NOTE — PCM.OPNOTE
- General Post-Op/Procedure Note


Date of Surgery/Procedure: 06/01/21


Operative Procedure(s): Exam under anesthesia and evacuation of rectovaginal 

septum hematoma


Findings: 





Patient on clinical exam and on MR imaging showed a 6.1 x 3 x 4 cm fusiform 

abnormality in the rectovaginal septum.  There was a contrast visualized wall 

with somewhat homogeneous contents within the cyst.  Preprocedure impression was

hematoma.  Findings on evacuation were consistent with old blood and clot.  

Significant induration noted in the area also.  Approximately 50 cc of old blood

removed.


Pre Op Diagnosis: Rectovaginal septum hematoma


Post-Op Diagnosis: Same


Anesthesia Technique: General ET Tube


Primary Surgeon: Arley Keen


Secondary Surgeon: Fly Cano


Anesthesia Provider: Kendrick Ledesma


Reason Assistant Was Necessary: 





Retraction, assistance, quality of care and patient safety.


Fluid Replacement, Intraop: 1,100


EBL in mLs: 50


Complications: None


Condition: Good


Free Text/Narrative:: 


Surgery duration: 17 minutes





Procedure: The patient was appropriately consented for the procedure after 

discussion was held with her concerning the procedure, risk, benefits, 

limitations and follow-up.  She appeared to understand.  Patient was taken to 

surgery and placed in supine position operating table.  She received 2 g of 

Ancef preoperatively for infection prophylaxis and had sequential compression 

devices in place for DVT prophylaxis.  General endotracheal anesthesia was 

administered.  After adequate anesthesia patient was placed in dorsolithotomy 

position.  She was prepped and draped in usual fashion.





A weighted speculum is placed in the vagina.  Exam under anesthesia had shown a 

masslike effect in the rectovaginal septum.  This was readily apparent on exam. 

The midline incision site from previous posterior vaginal repair was then 

incised for approximately 1-1/2 cm.  Suction device was used to aspirate 

contents of the cyst.  It appeared to be old blood and clot.  Some debris was 

removed with a hemostat.  The edges of the lesion were cauterized to obtain 

hemostasis.  Decision was made to leave it open and heal by secondary intent.  

This has been discussed with the patient.





At this point the weighted retractor was removed.  The vagina was cleared of old

blood with a stick sponge and the patient was returned to supine position, 

awakened in general endotracheal anesthesia.  She was discharged from the 

operating room in good condition.  She tolerated the procedure very well.

## 2021-06-01 NOTE — PCM.POSTAN
POST ANESTHESIA ASSESSMENT





- MENTAL STATUS


Mental Status: Alert, Oriented





- VITAL SIGNS


Vital Signs: 


                                Last Vital Signs











Temp  36.4 C   06/01/21 08:44


 


Pulse  57 L  06/01/21 07:10


 


Resp  22 H  06/01/21 08:44


 


BP  117/69   06/01/21 08:44


 


Pulse Ox  91 L  06/01/21 08:44














- RESPIRATORY


Respiratory Status: Respiratory Rate WNL, Airway Patent, O2 Saturation Stable, 

Supplemental Oxygen





- CARDIOVASCULAR


CV Status: Pulse Rate WNL, Blood Pressure Stable





- GASTROINTESTINAL


GI Status: No Symptoms





- PAIN


Pain Score: 0





- POST OP HYDRATION


Hydration Status: Adequate & Stable





- OBSERVATIONS


Free Text/Narrative:: 





no anesthesia complications noted

## 2021-06-01 NOTE — PCM.PREANE
Preanesthetic Assessment





- Procedure


Proposed Procedure: 





Evacuation of rectovaginal septum cyst








- Anesthesia/Transfusion/Family Hx


Anesthesia History: Prior Anesthesia Without Reaction


Family History of Anesthesia Reaction: No


Transfusion History: No Prior Transfusion(s)





- Review of Systems


General: No Symptoms


Pulmonary: No Symptoms


Cardiovascular: No Symptoms


Gastrointestinal: Abdominal Pain ("pressure")


Neurological: No Symptoms


Other: Reports: Thyroid Problems (hypothyroid)





- Physical Assessment


NPO Status Date: 05/31/21


NPO Status Time: 00:00


Height: 1.65 m


Weight: 77.3 kg


ASA Class: 2


Mental Status: Alert & Oriented x3


Airway Class: Mallampati = 1


Dentition: Reports: Normal Dentition, Crown(s)


Thyro-Mental Finger Breadths: 3


Mouth Opening Finger Breadths: 2


ROM/Head Extension: Full


Lungs: Clear to Auscultation, Normal Respiratory Effort


Cardiovascular: Regular Rate, Regular Rhythm





- Imaging/EKG


Impressions: 





EKG NSR rate 64





- Allergies


Allergies/Adverse Reactions: 


                                    Allergies











Allergy/AdvReac Type Severity Reaction Status Date / Time


 


Iodinated Contrast Media Allergy  Hives Verified 05/27/21 10:30


 


meperidine [From Demerol] AdvReac  Nausea and Verified 05/27/21 10:30





   Vomiting  














- Anesthesia Plan


Pre-Op Medication Ordered: None





- Acknowledgements


Anesthesia Type Planned: General Anesthesia


Pt an Appropriate Candidate for the Planned Anesthesia: Yes


Alternatives and Risks of Anesthesia Discussed w Pt/Guardian: Yes


Pt/Guardian Understands and Agrees with Anesthesia Plan: Yes





PreAnesthesia Questionnaire


HEENT History: Reports: Cataract, Impaired Vision


Cardiovascular History: Reports: High Cholesterol, Other (See Below)


Other Cardiovascular History: palpitations-panic attack


Respiratory History: Reports: None


Gastrointestinal History: Reports: None


Genitourinary History: Reports: None


OB/GYN History: Reports: Other (See Below)


Other OB/BYN History: thickened endometrium, HRT


Musculoskeletal History: Reports: Other (See Below)


Other Musculoskeletal History: myalgia, foot and leg pain


Neurological History: Reports: Migraines


Psychiatric History: Reports: Anxiety, Depression


Endocrine/Metabolic History: Reports: Hypothyroidism


Hematologic History: Reports: Other (See Below)


Other Hematologic History: thrombocytopathia


Immunologic History: Reports: None


Oncologic (Cancer) History: Reports: None


Dermatologic History: Reports: None





- Infectious Disease History


Infectious Disease History: Reports: Chicken Pox, Influenza, Mononucleosis





- Past Surgical History


Head Surgeries/Procedures: Reports: None


HEENT Surgical History: Reports: Cataract Surgery, Oral Surgery, Tonsillectomy


Cardiovascular Surgical History: Reports: None


Respiratory Surgical History: Reports: None


GI Surgical History: Reports: Appendectomy, Colonoscopy


Female  Surgical History: Reports: Breast Reduction, Tubal Ligation


Male  Surgical History: Reports: None


Endocrine Surgical History: Reports: None


Neurological Surgical History: Reports: None


Musculoskeletal Surgical History: Reports: None


Oncologic Surgical History: Reports: None


Dermatological Surgical History: Reports: None





- SUBSTANCE USE


Tobacco Use Status *Q: Never Tobacco User


Tobacco Use Within Last Twelve Months: No


Second Hand Smoke Exposure: No


Days Per Week of Alcohol Use: 1


Number of Drinks Per Day: 0


Total Drinks Per Week: 0


Recreational Drug Use History: No





- HOME MEDS


Home Medications: 


                                    Home Meds





Aspirin 81 mg PO DAILY 09/21/20 [History]


Levothyroxine Sodium 88 mcg PO DAILY 09/21/20 [History]


Vit C/Ascorb Sod/Multivit-Min [Emergen-C 500 mg Chewable Tab] 500 mg PO DAILY 

09/21/20 [History]


Cholecalciferol (Vitamin D3) [Vitamin D3] 1,000 unit PO DAILY 03/29/21 [History]


Ibuprofen 600 mg PO Q4HR PRN #30 tablet 03/30/21 [Rx]


Zolpidem Tartrate [Ambien] 10 mg PO BEDTIME PRN #10 tablet 03/31/21 [Rx]


estradioL [Estradiol] 1 mg PO DAILY #60 tab 03/31/21 [Rx]











- CURRENT (IN HOUSE) MEDS


Current Meds: 





                               Current Medications





Lactated Ringer's (Ringers, Lactated)  1,000 mls @ 125 mls/hr IV ASDIRECTED SOUTH


   Stop: 06/01/21 23:00


Lidocaine/Sodium Bicarbonate (Lidocaine 1%/Sod Bicarbonate In Ns 8.4% 1 Ml 

Syringe)  0.25 ml IDERM ONETIME PRN


   PRN Reason: Prior to IV Start


   Stop: 06/01/21 18:00


Sodium Chloride (Sodium Chloride 0.9% 10 Ml Syringe)  10 ml FLUSH ASDIRECTED PRN


   PRN Reason: Keep Vein Open


   Stop: 06/01/21 18:00

## 2021-06-01 NOTE — [id number]
Post Anesthesia Note





- EVALUATION WITHIN 48HRS OF ANESTHETIC


Vital Signs in Normal Range: Yes


Patient Participated in Evaluation: Yes


Respiratory Function Stable: Yes


Airway Patent: Yes


Cardiovascular Function Stable: Yes


Hydration Status Stable: Yes


Pain Control Satisfactory: Yes


Nausea and Vomiting Control Satisfactory: Yes


Mental Status Recovered: Yes


Vital Signs: 


                                Last Vital Signs











Temp  36.9 C   06/01/21 09:30


 


Pulse  57 L  06/01/21 07:10


 


Resp  10 L  06/01/21 09:30


 


BP  111/71   06/01/21 09:30


 


Pulse Ox  94 L  06/01/21 09:30

## 2023-05-15 ENCOUNTER — HOSPITAL ENCOUNTER (EMERGENCY)
Dept: HOSPITAL 41 - JD.ED | Age: 58
Discharge: HOME | End: 2023-05-15
Payer: COMMERCIAL

## 2023-05-15 VITALS — DIASTOLIC BLOOD PRESSURE: 89 MMHG | HEART RATE: 97 BPM | SYSTOLIC BLOOD PRESSURE: 128 MMHG

## 2023-05-15 DIAGNOSIS — E03.9: ICD-10-CM

## 2023-05-15 DIAGNOSIS — Z88.8: ICD-10-CM

## 2023-05-15 DIAGNOSIS — Z79.899: ICD-10-CM

## 2023-05-15 DIAGNOSIS — Z20.822: ICD-10-CM

## 2023-05-15 DIAGNOSIS — Z79.82: ICD-10-CM

## 2023-05-15 DIAGNOSIS — J10.1: ICD-10-CM

## 2023-05-15 DIAGNOSIS — Z90.49: ICD-10-CM

## 2023-05-15 DIAGNOSIS — Z91.041: ICD-10-CM

## 2023-05-15 DIAGNOSIS — G45.9: Primary | ICD-10-CM

## 2023-05-15 LAB
ALBUMIN SERPL-MCNC: 4.2 G/DL (ref 3.4–5)
ALBUMIN/GLOB SERPL: 1.2 {RATIO} (ref 1–2)
ALP SERPL-CCNC: 70 U/L (ref 46–116)
ALT SERPL-CCNC: 39 U/L (ref 14–59)
ANION GAP SERPL CALC-SCNC: 16.5 MMOL/L (ref 5–15)
AST SERPL-CCNC: 30 U/L (ref 15–37)
BASOPHILS # BLD AUTO: 0.02 K/MM3 (ref 0.01–0.08)
BASOPHILS NFR BLD AUTO: 0.2 % (ref 0.1–1.2)
BILIRUB SERPL-MCNC: 0.4 MG/DL (ref 0.2–1)
BUN SERPL-MCNC: 8 MG/DL (ref 7–18)
BUN/CREAT SERPL: 11.4 (ref 14–18)
CALCIUM SERPL-MCNC: 8.8 MG/DL (ref 8.5–10.1)
CHLORIDE SERPL-SCNC: 106 MEQ/L (ref 98–107)
CO2 SERPL-SCNC: 22 MEQ/L (ref 21–32)
CREAT CL 24H UR+SERPL-VRATE: (no result) ML/MIN
CREAT SERPL-MCNC: 0.7 MG/DL (ref 0.55–1.02)
EGFRCR SERPLBLD CKD-EPI 2021: 101 ML/MIN (ref 60–?)
EOSINOPHIL # BLD AUTO: 0.01 K/MM3 (ref 0.04–0.36)
EOSINOPHIL NFR BLD AUTO: 0.1 % (ref 0.7–5.8)
FLUAV RNA UPPER RESP QL NAA+PROBE: POSITIVE
GLOBULIN SER-MCNC: 3.6 GM/DL
GLUCOSE SERPL-MCNC: 118 MG/DL (ref 70–99)
HCT VFR BLD AUTO: 42.4 % (ref 34.1–44.9)
HGB BLD-MCNC: 14.7 GM/DL (ref 11.2–15.7)
IMM GRANULOCYTES # BLD: 0.03 K/MM3 (ref 0–0.1)
IMM GRANULOCYTES NFR BLD: 0.3 % (ref ?–1)
LYMPHOCYTES # BLD AUTO: 0.7 K/MM3 (ref 1.18–3.74)
LYMPHOCYTES NFR BLD AUTO: 7.5 % (ref 19.3–51.7)
MAGNESIUM SERPL-MCNC: 1.8 MG/DL (ref 1.8–2.4)
MCH RBC QN AUTO: 30.4 PG (ref 25.6–32.2)
MCHC RBC AUTO-ENTMCNC: 34.7 G/DL (ref 32.2–35.5)
MCHC RBC AUTO-ENTMCNC: 87.8 FL (ref 79.4–94.8)
MONOCYTES # BLD AUTO: 0.72 K/MM3 (ref 0.24–0.36)
MONOCYTES NFR BLD AUTO: 7.7 % (ref 4.7–12.5)
NEUTROPHILS # BLD AUTO: 7.82 K/MM3 (ref 1.56–6.13)
NEUTROPHILS NFR BLD AUTO: 84.2 % (ref 34–71.1)
PLATELET # BLD AUTO: 407 K/MM3 (ref 182–369)
PMV BLD AUTO: 10.2 FL (ref 9.4–12.3)
POTASSIUM SERPL-SCNC: 3.5 MEQ/L (ref 3.5–5.1)
PROT SERPL-MCNC: 7.8 G/DL (ref 6.4–8.2)
RBC # BLD AUTO: 4.83 M/MM3 (ref 3.98–5.22)
RSV RNA UPPER RESP QL NAA+PROBE: NEGATIVE
SARS-COV-2 RNA RESP QL NAA+PROBE: NEGATIVE
SODIUM SERPL-SCNC: 141 MEQ/L (ref 136–145)
TSH SERPL DL<=0.005 MIU/L-ACNC: 0.39 UIU/ML (ref 0.36–3.74)
WBC # BLD AUTO: 9.3 K/MM3 (ref 3.98–10.04)

## 2023-05-15 PROCEDURE — 96375 TX/PRO/DX INJ NEW DRUG ADDON: CPT

## 2023-05-15 PROCEDURE — 36415 COLL VENOUS BLD VENIPUNCTURE: CPT

## 2023-05-15 PROCEDURE — 83735 ASSAY OF MAGNESIUM: CPT

## 2023-05-15 PROCEDURE — 96376 TX/PRO/DX INJ SAME DRUG ADON: CPT

## 2023-05-15 PROCEDURE — 93005 ELECTROCARDIOGRAM TRACING: CPT

## 2023-05-15 PROCEDURE — 70450 CT HEAD/BRAIN W/O DYE: CPT

## 2023-05-15 PROCEDURE — 82947 ASSAY GLUCOSE BLOOD QUANT: CPT

## 2023-05-15 PROCEDURE — 80053 COMPREHEN METABOLIC PANEL: CPT

## 2023-05-15 PROCEDURE — 99285 EMERGENCY DEPT VISIT HI MDM: CPT

## 2023-05-15 PROCEDURE — 84443 ASSAY THYROID STIM HORMONE: CPT

## 2023-05-15 PROCEDURE — 82140 ASSAY OF AMMONIA: CPT

## 2023-05-15 PROCEDURE — 85025 COMPLETE CBC W/AUTO DIFF WBC: CPT

## 2023-05-15 PROCEDURE — 0241U: CPT

## 2023-05-15 PROCEDURE — 96374 THER/PROPH/DIAG INJ IV PUSH: CPT
